# Patient Record
Sex: MALE | Race: WHITE
[De-identification: names, ages, dates, MRNs, and addresses within clinical notes are randomized per-mention and may not be internally consistent; named-entity substitution may affect disease eponyms.]

---

## 2019-08-22 ENCOUNTER — HOSPITAL ENCOUNTER (EMERGENCY)
Dept: HOSPITAL 41 - JD.ED | Age: 79
Discharge: NURSING, REHAB OR LONG-TERM CARE | End: 2019-08-22
Payer: OTHER GOVERNMENT

## 2019-08-22 VITALS — SYSTOLIC BLOOD PRESSURE: 161 MMHG | DIASTOLIC BLOOD PRESSURE: 68 MMHG

## 2019-08-22 DIAGNOSIS — G35: ICD-10-CM

## 2019-08-22 DIAGNOSIS — E11.10: ICD-10-CM

## 2019-08-22 DIAGNOSIS — E87.6: ICD-10-CM

## 2019-08-22 DIAGNOSIS — Z79.899: ICD-10-CM

## 2019-08-22 DIAGNOSIS — F41.9: ICD-10-CM

## 2019-08-22 DIAGNOSIS — Z79.82: ICD-10-CM

## 2019-08-22 DIAGNOSIS — N39.0: ICD-10-CM

## 2019-08-22 DIAGNOSIS — I10: ICD-10-CM

## 2019-08-22 DIAGNOSIS — Z88.1: ICD-10-CM

## 2019-08-22 DIAGNOSIS — A41.9: Primary | ICD-10-CM

## 2019-08-22 DIAGNOSIS — Z79.84: ICD-10-CM

## 2019-08-22 DIAGNOSIS — F32.9: ICD-10-CM

## 2019-08-22 PROCEDURE — 36600 WITHDRAWAL OF ARTERIAL BLOOD: CPT

## 2019-08-22 PROCEDURE — 96376 TX/PRO/DX INJ SAME DRUG ADON: CPT

## 2019-08-22 PROCEDURE — 86140 C-REACTIVE PROTEIN: CPT

## 2019-08-22 PROCEDURE — 86738 MYCOPLASMA ANTIBODY: CPT

## 2019-08-22 PROCEDURE — 87040 BLOOD CULTURE FOR BACTERIA: CPT

## 2019-08-22 PROCEDURE — 82803 BLOOD GASES ANY COMBINATION: CPT

## 2019-08-22 PROCEDURE — 80053 COMPREHEN METABOLIC PANEL: CPT

## 2019-08-22 PROCEDURE — 85027 COMPLETE CBC AUTOMATED: CPT

## 2019-08-22 PROCEDURE — 81001 URINALYSIS AUTO W/SCOPE: CPT

## 2019-08-22 PROCEDURE — 83605 ASSAY OF LACTIC ACID: CPT

## 2019-08-22 PROCEDURE — 87088 URINE BACTERIA CULTURE: CPT

## 2019-08-22 PROCEDURE — 87899 AGENT NOS ASSAY W/OPTIC: CPT

## 2019-08-22 PROCEDURE — 96365 THER/PROPH/DIAG IV INF INIT: CPT

## 2019-08-22 PROCEDURE — 83930 ASSAY OF BLOOD OSMOLALITY: CPT

## 2019-08-22 PROCEDURE — 70450 CT HEAD/BRAIN W/O DYE: CPT

## 2019-08-22 PROCEDURE — 85007 BL SMEAR W/DIFF WBC COUNT: CPT

## 2019-08-22 PROCEDURE — 84145 PROCALCITONIN (PCT): CPT

## 2019-08-22 PROCEDURE — 87086 URINE CULTURE/COLONY COUNT: CPT

## 2019-08-22 PROCEDURE — 71045 X-RAY EXAM CHEST 1 VIEW: CPT

## 2019-08-22 PROCEDURE — 36415 COLL VENOUS BLD VENIPUNCTURE: CPT

## 2019-08-22 PROCEDURE — 96361 HYDRATE IV INFUSION ADD-ON: CPT

## 2019-08-22 PROCEDURE — 99285 EMERGENCY DEPT VISIT HI MDM: CPT

## 2019-08-22 PROCEDURE — 96367 TX/PROPH/DG ADDL SEQ IV INF: CPT

## 2019-08-22 PROCEDURE — 82009 KETONE BODYS QUAL: CPT

## 2019-08-22 NOTE — CT
Head CT

 

Technique: Multiple axial sections through the brain were obtained.  

Intravenous contrast was not utilized.

 

Comparison: Prior head CT study of 08/16/19.

 

Findings: Ventricles along with basal cisterns and sulci over the 

convexities are moderately prominent.  Mild areas of diminished 

density is noted within portions of the periventricular white matter 

compatible with small vessel ischemic demyelination change.  No other 

abnormal parenchymal densities are seen.  No evidence of intracranial 

hemorrhage.  No midline shift or mass effect is seen.

 

Bone window settings were reviewed which shows mucosal thickening 

within the right maxillary sinus as well as within the ethmoid 

sinuses.  No acute calvarial abnormality is appreciated.

 

Impression:

1.  Sinus findings which are fairly stable from previous exam.  As 

mentioned previously, please correlate that patient has no symptoms of

 sinusitis.

2.  Senescent change as noted above which is fairly stable from prior 

head CT exam.

3.  No acute intracranial abnormality is appreciated.

 

Diagnostic code #2

## 2019-08-22 NOTE — EDM.PDOC
ED HPI GENERAL MEDICAL PROBLEM





- General


Chief Complaint: Fever


Stated Complaint: [facility]


Time Seen by Provider: 08/22/19 16:17


Source of Information: Reports: Patient, Family


History Limitations: Reports: Altered Mental Status





- History of Present Illness


INITIAL COMMENTS - FREE TEXT/NARRATIVE: 


79-year-old male with history of MS and UTI presents to the ED via EMS with 

concerns of altered mental status and fever. Wife states patient has a history 

of kidney stone that required lithotripsy and stent placement. Patient had 1 

stone remaining that required further treatment. Patient was scheduled for 

cystoscopy but had to be canceled due to UTI. Patient was discharged from the 

hospital 8/15/2019. Up until today patient has been doing quite well. He has 

been taking Cipro as prescribed. There has been no recent trauma associated 

with his altered mental status. Wife states the patient has been almost falling 

out of his wheelchair with this confusion thus prompting evaluation. There has 

been no fall.  





- Related Data


 Allergies











Allergy/AdvReac Type Severity Reaction Status Date / Time


 


amoxicillin trihydrate AdvReac  Indigestion Verified 08/22/19 16:06





[From Augmentin]     


 


potassium clavulanate AdvReac  Indigestion Verified 08/22/19 16:06





[From Augmentin]     











Home Meds: 


 Home Meds





Baclofen 1 tab PO DAILY 06/27/14 [History]


Furosemide [Lasix] 1 tab PO DAILY 06/27/14 [History]


Gabapentin [Neurontin] 2 cap PO Q6HR 06/27/14 [History]


LORazepam [Ativan] 0.5 mg PO 09,12,16 06/27/14 [History]


Potassium Chloride 1 tab PO DAILY 06/27/14 [History]


glyBURIDE [Glyburide] 10 tab PO BID 06/27/14 [History]


Bisacodyl [Dulcolax] 10 mg RECTAL DAILY PRN 09/22/15 [History]


Aspirin [Halfprin] 81 mg PO DAILY 07/11/19 [History]


Bisacodyl [Dulcolax] 1 tab RECTAL TUFR 07/11/19 [History]


Docusate Sodium [Colace] 100 mg PO DAILY 07/11/19 [History]


LORazepam [Ativan] 0.5 mg PO DAILY PRN 07/11/19 [History]


Levothyroxine Sodium [Synthroid] 75 mcg PO DAILY 07/11/19 [History]


Rosuvastatin Calcium 10 mg PO BEDTIME 07/11/19 [History]


Sertraline HCl 50 mg PO DAILY 07/11/19 [History]


Vit D3/Folic Acid/B2/B6/B12 [Folgard Tablet] 1,000 units PO DAILY 07/11/19 [

History]


carBAMazepine [TEGretol] 100 mg PO BID 07/11/19 [History]


Ciprofloxacin [Ciprofloxacin HCl] 500 mg PO BID #14 tab 08/16/19 [Rx]


Nystatin 30 gm TP BID #1 cream..g. 08/16/19 [Rx]


predniSONE [Prednisone] 20 mg PO DAILY #3 tablet 08/16/19 [Rx]











Past Medical History


HEENT History: Reports: Hard of Hearing


Cardiovascular History: Reports: Hypertension


Gastrointestinal History: Reports: Other (See Below)


Other Gastrointestinal History: paralyzed colon with chronic suppositories Tues/

Thurs for bowel managment


Genitourinary History: Reports: Retention, Urinary


Other Genitourinary History: chronic intermittent cath at home


Musculoskeletal History: Reports: Other (See Below)


Other Musculoskeletal History: chronic muscle weakness, left side worse 

weakness than right- uses citlaly lift at home, able to assist with bed mobility 

with upper body-


Neurological History: Reports: MS, Seizure


Other Neuro History: facial twitching which nuerologist believes to be mini 

seizures


Psychiatric History: Reports: Anxiety, Depression


Other Psychiatric History: on ativan


Endocrine/Metabolic History: Reports: Diabetes, Type II


Other Endocrine/Metabolic History: wife states he is borderline and on po med


Other Oncologic History: not assessed with wife





- Infectious Disease History


Other Infectious Disease History: denies MRSA and VRE





- Past Surgical History


Dermatological Surgical History: Reports: Skin Graft





Social & Family History





- Family History


Family Medical History: Noncontributory





- Caffeine Use


Caffeine Use: Reports: None





ED ROS GENERAL





- Review of Systems


Review Of Systems: Unable To Obtain





- Physical Exam


Exam: See Below


Exam Limited By: Other (Dry mouth difficulty to understand speech. Confused.)


General Appearance: Alert, Lethargic


Eye Exam: Bilateral Eye: Normal Inspection, PERRL


Ears: Hearing Grossly Normal


Nose: Normal Inspection


Throat/Mouth: No Airway Compromise, Other (Dry oromucosa with slurred speech 

present.)


Head Exam: Atraumatic, Normocephalic


Neck: Normal Inspection, Supple, Non-Tender, Full Range of Motion.  No: 

Lymphadenopathy (L), Lymphadenopathy (R)


Respiratory/Chest: No Respiratory Distress, Lungs Clear, Normal Breath Sounds, 

No Accessory Muscle Use, Chest Non-Tender


Cardiovascular: Normal Peripheral Pulses, Regular Rate, Rhythm, No Murmur


GI/Abdominal: Normal Bowel Sounds, Soft, Non-Tender, No Organomegaly, No 

Distention


Neuro Exam (Abbreviated): Alert, Other (Patient's able to lift his arms in 

front of him hold him upright while closing his eyes with no pronator drift. No 

obvious weakness discrepancy is to the upper extremity. He does not follow 

commands.).  No: Normal Cognition (Confused)


Back Exam: Normal Inspection


Extremities: Normal Inspection, Non-Tender, Pedal Edema (Trace bilaterally)


Skin Exam: Warm, Dry, Intact, Normal Color, No Rash





Course





- Vital Signs


Last Recorded V/S: 


 Last Vital Signs











Temp  98.9 F   08/22/19 16:00


 


Pulse  112 H  08/22/19 16:00


 


Resp  28 H  08/22/19 16:00


 


BP  161/68 H  08/22/19 16:00


 


Pulse Ox  91 L  08/22/19 16:00














- Orders/Labs/Meds


Orders: 


 Active Orders 24 hr











 Category Date Time Status


 


 Glucose [Blood Glucose Check, Bedside] [RC] ONETIME Care  08/22/19 20:00 Active


 


 Chest 1V Frontal [CR] Stat Exams  08/22/19 16:25 Taken


 


 CULTURE BLOOD [BC] Stat Lab  08/22/19 17:07 Received


 


 CULTURE BLOOD [BC] Stat Lab  08/22/19 17:55 Received


 


 CULTURE URINE [RM] Stat Lab  08/22/19 18:40 Received


 


 PROCALCITONIN [REF] Stat Lab  08/22/19 17:07 Received


 


 STREP PNEUMONIAE ANTIGEN [MREF] Stat Lab  08/22/19 18:40 Received


 


 Blood Culture x2 Reflex Set [OM.PC] Stat Oth  08/22/19 16:25 Ordered











Labs: 


 Laboratory Tests











  08/22/19 08/22/19 08/22/19 Range/Units





  17:07 17:07 17:07 


 


WBC  21.23 H    (4.23-9.07)  K/mm3


 


RBC  4.67    (4.63-6.08)  M/mm3


 


Hgb  14.0    (13.7-17.5)  gm/L


 


Hct  41.8    (40.1-51.0)  %


 


MCV  89.5    (79.0-92.2)  fl


 


MCH  30.0    (25.7-32.2)  pg


 


MCHC  33.5    (32.2-35.5)  g/dl


 


RDW Std Deviation  43.0    (35.1-43.9)  fL


 


Plt Count  329    (163-337)  K/mm3


 


MPV  11.4    (9.4-12.3)  fl


 


Neutrophils % (Manual)  82 H    (40-60)  %


 


Band Neutrophils %  0    (0-10)  %


 


Lymphocytes % (Manual)  14 L    (20-40)  %


 


Atypical Lymphs %  0    %


 


Monocytes % (Manual)  3    (2-10)  %


 


Eosinophils % (Manual)  1    (0.8-7.0)  %


 


Basophils % (Manual)  0 L    (0.2-1.2)  


 


Platelet Estimate  Adequate    


 


Plt Morphology Comment  Normal    


 


RBC Morph Comment  Normal    


 


Puncture Site     


 


ABG pH     (7.35-7.45)  


 


ABG pCO2     (35.0-45.0)  mmHg


 


ABG pO2     (80.0-100.0)  mmHg


 


ABG HCO3     (22.0-26.0)  meq/L


 


ABG O2 Saturation     (96.0-97.0)  %


 


ABG Base Excess     (-2-2.0)  


 


Javi Test     


 


O2 Delivery Device     


 


FiO2     (21..00)  %


 


Sodium   134 L   (136-145)  mEq/L


 


Potassium   2.8 L   (3.5-5.1)  mEq/L


 


Chloride   91 L D   ()  mEq/L


 


Carbon Dioxide   30   (21-32)  mEq/L


 


Anion Gap   15.8 H   (5-15)  


 


BUN   22 H   (7-18)  mg/dL


 


Creatinine   1.1   (0.7-1.3)  mg/dL


 


Est Cr Clr Drug Dosing   TNP   


 


Estimated GFR (MDRD)   > 60   (>60)  mL/min


 


BUN/Creatinine Ratio   20.0 H   (14-18)  


 


Glucose   542 H   ()  mg/dL


 


Serum Osmolality     (280-300)  mosm/kg


 


Lactic Acid    1.6  (0.4-2.0)  mmol/L


 


Calcium   9.5   (8.5-10.1)  mg/dL


 


Total Bilirubin   0.6   (0.2-1.0)  mg/dL


 


AST   20   (15-37)  U/L


 


ALT   37   (16-63)  U/L


 


Alkaline Phosphatase   116   ()  U/L


 


C-Reactive Protein   36.3 H*   (<1.0)  mg/dL


 


Total Protein   8.6 H   (6.4-8.2)  g/dl


 


Albumin   3.0 L   (3.4-5.0)  g/dl


 


Globulin   5.6   gm/dL


 


Albumin/Globulin Ratio   0.5 L   (1-2)  


 


Urine Color     (Yellow)  


 


Urine Appearance     (Clear)  


 


Urine pH     (5.0-8.0)  


 


Ur Specific Gravity     (1.005-1.030)  


 


Urine Protein     (Negative)  


 


Urine Glucose (UA)     (Negative)  


 


Urine Ketones     (Negative)  


 


Urine Occult Blood     (Negative)  


 


Urine Nitrite     (Negative)  


 


Urine Bilirubin     (Negative)  


 


Urine Urobilinogen     (0.2-1.0)  


 


Ur Leukocyte Esterase     (Negative)  


 


Urine RBC     (0-5)  /hpf


 


Urine WBC     (0-5)  /hpf


 


Urine WBC Clumps     (NOT SEEN)  /hpf


 


Ur Epithelial Cells     (0-5)  /hpf


 


Urine Bacteria     (FEW)  /hpf


 


Urine Mucus     (FEW)  /hpf


 


Urine Yeast     (NOT SEEN)  


 


Ketones     (0.0-0.3)  mM


 


Mycoplasma pneumon IgM   Negative   (NEGATIVE)  














  08/22/19 08/22/19 08/22/19 Range/Units





  17:07 17:07 18:40 


 


WBC     (4.23-9.07)  K/mm3


 


RBC     (4.63-6.08)  M/mm3


 


Hgb     (13.7-17.5)  gm/L


 


Hct     (40.1-51.0)  %


 


MCV     (79.0-92.2)  fl


 


MCH     (25.7-32.2)  pg


 


MCHC     (32.2-35.5)  g/dl


 


RDW Std Deviation     (35.1-43.9)  fL


 


Plt Count     (163-337)  K/mm3


 


MPV     (9.4-12.3)  fl


 


Neutrophils % (Manual)     (40-60)  %


 


Band Neutrophils %     (0-10)  %


 


Lymphocytes % (Manual)     (20-40)  %


 


Atypical Lymphs %     %


 


Monocytes % (Manual)     (2-10)  %


 


Eosinophils % (Manual)     (0.8-7.0)  %


 


Basophils % (Manual)     (0.2-1.2)  


 


Platelet Estimate     


 


Plt Morphology Comment     


 


RBC Morph Comment     


 


Puncture Site     


 


ABG pH     (7.35-7.45)  


 


ABG pCO2     (35.0-45.0)  mmHg


 


ABG pO2     (80.0-100.0)  mmHg


 


ABG HCO3     (22.0-26.0)  meq/L


 


ABG O2 Saturation     (96.0-97.0)  %


 


ABG Base Excess     (-2-2.0)  


 


Javi Test     


 


O2 Delivery Device     


 


FiO2     (21..00)  %


 


Sodium     (136-145)  mEq/L


 


Potassium     (3.5-5.1)  mEq/L


 


Chloride     ()  mEq/L


 


Carbon Dioxide     (21-32)  mEq/L


 


Anion Gap     (5-15)  


 


BUN     (7-18)  mg/dL


 


Creatinine     (0.7-1.3)  mg/dL


 


Est Cr Clr Drug Dosing     


 


Estimated GFR (MDRD)     (>60)  mL/min


 


BUN/Creatinine Ratio     (14-18)  


 


Glucose     ()  mg/dL


 


Serum Osmolality  315 H    (280-300)  mosm/kg


 


Lactic Acid     (0.4-2.0)  mmol/L


 


Calcium     (8.5-10.1)  mg/dL


 


Total Bilirubin     (0.2-1.0)  mg/dL


 


AST     (15-37)  U/L


 


ALT     (16-63)  U/L


 


Alkaline Phosphatase     ()  U/L


 


C-Reactive Protein     (<1.0)  mg/dL


 


Total Protein     (6.4-8.2)  g/dl


 


Albumin     (3.4-5.0)  g/dl


 


Globulin     gm/dL


 


Albumin/Globulin Ratio     (1-2)  


 


Urine Color    Yellow  (Yellow)  


 


Urine Appearance    Cloudy H  (Clear)  


 


Urine pH    6.5  (5.0-8.0)  


 


Ur Specific Gravity    1.015  (1.005-1.030)  


 


Urine Protein    1+ H  (Negative)  


 


Urine Glucose (UA)    2+ H  (Negative)  


 


Urine Ketones    Negative  (Negative)  


 


Urine Occult Blood    2+ H  (Negative)  


 


Urine Nitrite    Negative  (Negative)  


 


Urine Bilirubin    Negative  (Negative)  


 


Urine Urobilinogen    0.2  (0.2-1.0)  


 


Ur Leukocyte Esterase    1+ H  (Negative)  


 


Urine RBC    10-20 H  (0-5)  /hpf


 


Urine WBC     H  (0-5)  /hpf


 


Urine WBC Clumps    Few  (NOT SEEN)  /hpf


 


Ur Epithelial Cells    0-5  (0-5)  /hpf


 


Urine Bacteria    Moderate H  (FEW)  /hpf


 


Urine Mucus    Not seen  (FEW)  /hpf


 


Urine Yeast    Moderate H  (NOT SEEN)  


 


Ketones   0.58   (0.0-0.3)  mM


 


Mycoplasma pneumon IgM     (NEGATIVE)  














  08/22/19 Range/Units





  20:20 


 


WBC   (4.23-9.07)  K/mm3


 


RBC   (4.63-6.08)  M/mm3


 


Hgb   (13.7-17.5)  gm/L


 


Hct   (40.1-51.0)  %


 


MCV   (79.0-92.2)  fl


 


MCH   (25.7-32.2)  pg


 


MCHC   (32.2-35.5)  g/dl


 


RDW Std Deviation   (35.1-43.9)  fL


 


Plt Count   (163-337)  K/mm3


 


MPV   (9.4-12.3)  fl


 


Neutrophils % (Manual)   (40-60)  %


 


Band Neutrophils %   (0-10)  %


 


Lymphocytes % (Manual)   (20-40)  %


 


Atypical Lymphs %   %


 


Monocytes % (Manual)   (2-10)  %


 


Eosinophils % (Manual)   (0.8-7.0)  %


 


Basophils % (Manual)   (0.2-1.2)  


 


Platelet Estimate   


 


Plt Morphology Comment   


 


RBC Morph Comment   


 


Puncture Site  Lt radial  


 


ABG pH  7.44  (7.35-7.45)  


 


ABG pCO2  40.9  (35.0-45.0)  mmHg


 


ABG pO2  55.0 L  (80.0-100.0)  mmHg


 


ABG HCO3  27.5 H  (22.0-26.0)  meq/L


 


ABG O2 Saturation  88.4 L  (96.0-97.0)  %


 


ABG Base Excess  3.6 H  (-2-2.0)  


 


Javi Test  Positive  


 


O2 Delivery Device  Room air  


 


FiO2  0.00 L  (21..00)  %


 


Sodium   (136-145)  mEq/L


 


Potassium   (3.5-5.1)  mEq/L


 


Chloride   ()  mEq/L


 


Carbon Dioxide   (21-32)  mEq/L


 


Anion Gap   (5-15)  


 


BUN   (7-18)  mg/dL


 


Creatinine   (0.7-1.3)  mg/dL


 


Est Cr Clr Drug Dosing   


 


Estimated GFR (MDRD)   (>60)  mL/min


 


BUN/Creatinine Ratio   (14-18)  


 


Glucose   ()  mg/dL


 


Serum Osmolality   (280-300)  mosm/kg


 


Lactic Acid   (0.4-2.0)  mmol/L


 


Calcium   (8.5-10.1)  mg/dL


 


Total Bilirubin   (0.2-1.0)  mg/dL


 


AST   (15-37)  U/L


 


ALT   (16-63)  U/L


 


Alkaline Phosphatase   ()  U/L


 


C-Reactive Protein   (<1.0)  mg/dL


 


Total Protein   (6.4-8.2)  g/dl


 


Albumin   (3.4-5.0)  g/dl


 


Globulin   gm/dL


 


Albumin/Globulin Ratio   (1-2)  


 


Urine Color   (Yellow)  


 


Urine Appearance   (Clear)  


 


Urine pH   (5.0-8.0)  


 


Ur Specific Gravity   (1.005-1.030)  


 


Urine Protein   (Negative)  


 


Urine Glucose (UA)   (Negative)  


 


Urine Ketones   (Negative)  


 


Urine Occult Blood   (Negative)  


 


Urine Nitrite   (Negative)  


 


Urine Bilirubin   (Negative)  


 


Urine Urobilinogen   (0.2-1.0)  


 


Ur Leukocyte Esterase   (Negative)  


 


Urine RBC   (0-5)  /hpf


 


Urine WBC   (0-5)  /hpf


 


Urine WBC Clumps   (NOT SEEN)  /hpf


 


Ur Epithelial Cells   (0-5)  /hpf


 


Urine Bacteria   (FEW)  /hpf


 


Urine Mucus   (FEW)  /hpf


 


Urine Yeast   (NOT SEEN)  


 


Ketones   (0.0-0.3)  mM


 


Mycoplasma pneumon IgM   (NEGATIVE)  











Meds: 


Medications














Discontinued Medications














Generic Name Dose Route Start Last Admin





  Trade Name Freq  PRN Reason Stop Dose Admin


 


Sodium Chloride  1,000 mls @ 999 mls/hr  08/22/19 16:25  08/22/19 17:06





  Normal Saline  IV  08/22/19 17:25  999 mls/hr





  ONETIME ONE   Administration





     





     





     





     


 


Levofloxacin/Dextrose 750 mg/  150 mls @ 100 mls/hr  08/22/19 18:08  08/22/19 18

:28





  Premix  IV  08/22/19 19:37  100 mls/hr





  ONETIME ONE   Administration





     





     





     





     


 


Potassium Chloride 10 meq/  100 mls @ 100 mls/hr  08/22/19 18:35  08/22/19 19:57





  Premix  IV  08/22/19 19:34  100 mls/hr





  ASDIRECTED ONE   Administration





     





     





     





     


 


Potassium Chloride 10 meq/  100 mls @ 100 mls/hr  08/22/19 18:34  08/22/19 20:53





  Premix  IV  08/22/19 19:33  100 mls/hr





  ASDIRECTED ONE   Administration





     





     





     





     


 


Potassium Chloride 10 meq/  100 mls @ 100 mls/hr  08/22/19 18:34  





  Premix  IV  08/22/19 19:33  





  ONETIME ONE   





     





     





     





     


 


Sodium Chloride  1,000 mls @ 250 mls/hr  08/22/19 18:35  08/22/19 21:01





  Normal Saline  IV  08/22/19 22:34  999 mls/hr





  ONETIME ONE   Administration





     





     





     





     


 


Sodium Chloride  Confirm  08/22/19 20:57  08/22/19 21:03





  Normal Saline  Administered  08/22/19 20:58  Not Given





  Dose   





  1,000 mls @ as directed   





  .ROUTE   





  .STK-MED ONE   





     





     





     





     


 


Sodium Chloride  1,000 mls @ 999 mls/hr  08/22/19 21:00  08/22/19 21:03





  Normal Saline  IV  08/22/19 22:00  999 mls/hr





  ONETIME ONE   Administration





     





     





     





     


 


Potassium Chloride  40 meq  08/22/19 18:41  08/22/19 19:18





  Potassium Chloride Solution  PO  08/22/19 18:42  40 meq





  ONETIME ONE   Administration





     





     





     





     














- Re-Assessments/Exams


Free Text/Narrative Re-Assessment/Exam: 








On exam patient's vital signs are 161/68, heart rate 110, temperature 98.9F, 

RR 28 with O2 sats 91% on room air. Patient is slightly confused with a dry 

mouth. He offers no complaints at this time. Wife who is present states the 

patient has a history of MS with recent urinary tract infection and kidney 

stones that required lithotripsy and stent placement. Patient had 2 kidney 

stones with one remaining.  Patient was just recently discharged from the 

hospital this past Thursday. He was admitted to [facility] and underwent 

lithotripsy and stent placement able to break the 1 kidney stone that was 

blocking the ureter. Patient has one other stone remaining in the kidney of 

unknown side that requires further treatment. Patient has been taking the Cipro 

as prescribed for unresolved UTI. As of this morning developed some confusion 

with subjective fever. Patient has been slightly short of breath per wife with 

no cough present. Patient has been weak and almost falling out of his chair. 

Patient has not fallen out of the chair or suffered any form of trauma. EMS was 

notified and transported the patient to the ED with a temperature of 102F. 

Upon admission to the ED patient has no fever.





IV will be established with normal saline 1 L IV bolus.





Initial labs and studies will include: CBC, chem 14, CRP, blood culture 2, but 

the gas a, UA via quick in and out catheter, and also will obtain mycoplasma, 

calcitonin, and strep pneumonia with recent hospitalization.  





08/22/19 17:24 CXR revealed no acute findings. Reviewed with Dr. Pena.  Final 

interpretation is pending.  





CT of the head impression: Sinus findings which are fairly stable from previous 

exam. As mentioned previously, please correlate the patient has no symptoms of 

sinusitis. Senescent change as noted above which is fairly stable from prior 

head CT exam. No acute intracranial abnormality is appreciated.





Labs reviewed: White blood cell count 21.23, hemoglobin 14.0, platelet count 329

, differential is pending.  Sodium 134, potassium 2.8, chloride 91, CO2 30, AG 

15.8, BUN 22, crit 1.1, glucose 542, CRP pending.





Discussed patient with Dr. Pena suggested IV levaquin since patient had a 

adverse reaction with taking keflex last E.D. visit.  





Per MD calc patient is in non anion gap acidosis.  





CRP Pending and differential pending.  Mycoplasma is pending.





Reassessment, patient states he is feeling much better. Speech is almost back 

to baseline per wife.  Patients HR trending down with BP maintaining. Ordered 

potassium 10 mEq IV x 3. Additional 1 liter of NS 250mls/hr ordered.  I do not 

want to fluid overload the patient.  I have ordered liquid potassium 40 mEq by 

mouth. Per wife patient has been able to swallow with no issues.   





CRP 36.3, serum osmolality 315, and lactic acid 1.6.





UA positive for protein, glucose, occult blood, leukocyte esterase, rbc's 10-20

, WBC , bacteria moderate, and yeast moderate.  





Ketones 0.58 which is elevated. Mycoplasma negative.  





CRP 36.3, serum osmolality 315, and lactic acid 1.6.





UA positive for protein, glucose, occult blood, leukocyte esterase, rbc's 10-20

, WBC , bacteria moderate, and yeast moderate.  





Ketones 0.58 which is elevated. Mycoplasma negative.





2012 POC BS  >400.  IVF's changed to 999mls/hr.  although patient does have a 

history of type 2 diabetes he is in DKA with elevated ketones and serum 

osmolality. Cannot start patient on any insulin at this point with hypokalemia. 

This has to be corrected prior.  Patient meets sepsis criteria.  Per wife 

patient weighs 83kgs. 





2021 Discussed patient with Dr.Bhandari Rudy Matthew.  He has agreed to 

accept the patient.  Transfer paperwork has been completed.  EMS has been 

notified.  Requested abg.  This has been ordered. 





08/22/19 21:01 ABG pH 7.44, PCO2 40.9, PO2 55, HCO3 27.5, O2 sats 88.4, ABG 

base excess 3.6. Per MD Calc: Primary Metabolic Alkalosis with appropriately 

compensated by Respiratory Acidosis.  Unable to determine cause of low PO2.  He 

is tachycardic with mildly low O2 sats of 91%.  Discussed ABG with . 

Suspects cause of low O2 secondary to his inability to take adequate 

inspiration due to MS.  He may have a PE but unlikely. Results to be faxed to 

Rudy Matthew. 











Departure





- Departure


Time of Disposition: 20:25


Disposition: DC/Tfer to [facility] 02


Condition: Fair


Clinical Impression: 


 Complicated UTI (urinary tract infection), DKA, type 2, not at goal, 

Hypokalemia





Sepsis


Qualifiers:


 Sepsis type: sepsis due to unspecified organism Sepsis acute organ dysfunction 

status: without acute organ dysfunction Qualified Code(s): A41.9 - Sepsis, 

unspecified organism








- Discharge Information


Referrals: 


PCP,Unknown [Ordering Only Provider] - 


Forms:  ED Department Discharge





- My Orders


Last 24 Hours: 


My Active Orders





08/22/19 16:25


Chest 1V Frontal [CR] Stat 


Blood Culture x2 Reflex Set [OM.PC] Stat 





08/22/19 17:07


CULTURE BLOOD [BC] Stat 


PROCALCITONIN [REF] Stat 





08/22/19 17:55


CULTURE BLOOD [BC] Stat 





08/22/19 18:40


CULTURE URINE [RM] Stat 


STREP PNEUMONIAE ANTIGEN [MREF] Stat 





08/22/19 20:00


Glucose [Blood Glucose Check, Bedside] [RC] ONETIME 














- Assessment/Plan


Last 24 Hours: 


My Active Orders





08/22/19 16:25


Chest 1V Frontal [CR] Stat 


Blood Culture x2 Reflex Set [OM.PC] Stat 





08/22/19 17:07


CULTURE BLOOD [BC] Stat 


PROCALCITONIN [REF] Stat 





08/22/19 17:55


CULTURE BLOOD [BC] Stat 





08/22/19 18:40


CULTURE URINE [RM] Stat 


STREP PNEUMONIAE ANTIGEN [MREF] Stat 





08/22/19 20:00


Glucose [Blood Glucose Check, Bedside] [RC] ONETIME

## 2019-08-23 NOTE — CR
Chest: Portable view of the chest was obtained.

 

Comparison: Prior chest x-ray of 07/11/19.

 

Heart size is normal.  Tortuous thoracic aorta is seen.  Lungs are 

clear.  Bony structures are grossly intact.

 

Impression:

1.  Nothing acute is seen on portable chest x-ray.

 

Diagnostic code #1